# Patient Record
Sex: MALE | Race: WHITE
[De-identification: names, ages, dates, MRNs, and addresses within clinical notes are randomized per-mention and may not be internally consistent; named-entity substitution may affect disease eponyms.]

---

## 2017-09-08 ENCOUNTER — HOSPITAL ENCOUNTER (OUTPATIENT)
Dept: HOSPITAL 61 - MAMMO | Age: 62
Discharge: HOME | End: 2017-09-08
Attending: NURSE PRACTITIONER
Payer: COMMERCIAL

## 2017-09-08 DIAGNOSIS — N64.4: ICD-10-CM

## 2017-09-08 DIAGNOSIS — N62: ICD-10-CM

## 2017-09-08 DIAGNOSIS — R92.8: Primary | ICD-10-CM

## 2017-09-08 PROCEDURE — 76641 ULTRASOUND BREAST COMPLETE: CPT

## 2017-09-08 NOTE — RAD
DATE: 9/8/2017   



EXAM: DIGITAL DIAGNOSTIC BILATERAL, right breast ultrasound



HISTORY: Right breast pain   



COMPARISON: None available   



This study was interpreted with the benefit of Computerized Aided Detection

(CAD).





FINDINGS: There is streaky increased density in the right retroareolar region.

A much smaller, but otherwise similar density is present on the left. The

pattern is typical of unilateral gynecomastia. No other unusual breast

densities are seen. No suspicious microcalcifications are evident.





Right breast ultrasound, 9/8/2017:



A targeted ultrasound exam of the retroareolar region of the right breast was

performed. There is a mixed hypoechoic and hyperechoic process in the right

retroareolar region in a somewhat flame-shaped configuration.  No discrete

shadowing mass is seen. Correlation with the mammographic findings suggest

unilateral gynecomastia.





IMPRESSION: Right unilateral gynecomastia. Clinical surveillance is suggested.





BI-RADS CATEGORY: 2 BENIGN FINDING(S)



RECOMMENDED FOLLOW-UP: CLIN FOLLOW UP IMAGING AS CLINICALLY INDICATED





Mammography is a sensitive method for finding small breast cancers, but it

does not detect them all and is not a substitute for careful clinical

examination.  A negative mammogram does not negate a clinically suspicious

finding and should not result in delay in biopsying a clinically suspicious

abnormality.



"Our facility is accredited by the [employer] Mammography

Program."

## 2018-05-06 ENCOUNTER — HOSPITAL ENCOUNTER (EMERGENCY)
Dept: HOSPITAL 61 - ER | Age: 63
Discharge: HOME | End: 2018-05-06
Payer: COMMERCIAL

## 2018-05-06 DIAGNOSIS — F17.210: ICD-10-CM

## 2018-05-06 DIAGNOSIS — I10: ICD-10-CM

## 2018-05-06 DIAGNOSIS — J44.1: Primary | ICD-10-CM

## 2018-05-06 LAB
ADD MAN DIFF?: NO
ALBUMIN SERPL-MCNC: 3.5 G/DL (ref 3.4–5)
ALBUMIN/GLOB SERPL: 0.9 {RATIO} (ref 1–1.7)
ALP SERPL-CCNC: 101 U/L (ref 46–116)
ALT (SGPT): 50 U/L (ref 16–63)
ANION GAP SERPL CALC-SCNC: 9 MMOL/L (ref 6–14)
AST SERPL-CCNC: 32 U/L (ref 15–37)
BASE EXCESS ABG: 1 MMOL/L (ref -3–3)
BASO #: 0.1 X10^3/UL (ref 0–0.2)
BASO %: 1 % (ref 0–3)
BLOOD UREA NITROGEN: 16 MG/DL (ref 8–26)
BUN/CREAT SERPL: 16 (ref 6–20)
CALCIUM: 8.6 MG/DL (ref 8.5–10.1)
CHLORIDE: 109 MMOL/L (ref 98–107)
CO2 SERPL-SCNC: 28 MMOL/L (ref 21–32)
CREAT SERPL-MCNC: 1 MG/DL (ref 0.7–1.3)
EOS #: 0.1 X10^3/UL (ref 0–0.7)
EOS %: 1 % (ref 0–3)
FIO2 ABG: 28
GFR SERPLBLD BASED ON 1.73 SQ M-ARVRAT: 75.7 ML/MIN
GLOBULIN SER-MCNC: 3.7 G/DL (ref 2.2–3.8)
GLUCOSE SERPL-MCNC: 124 MG/DL (ref 70–99)
HCG SERPL-ACNC: 8.7 X10^3/UL (ref 4–11)
HCO3 ABG: 26 MMOL/L (ref 21–28)
HEMATOCRIT: 45.3 % (ref 39–53)
HEMOGLOBIN: 15.5 G/DL (ref 13–17.5)
LYMPH #: 2.1 X10^3/UL (ref 1–4.8)
LYMPH %: 24 % (ref 24–48)
MEAN CORPUSCULAR HEMOGLOBIN: 33 PG (ref 25–35)
MEAN CORPUSCULAR HGB CONC: 34 G/DL (ref 31–37)
MEAN CORPUSCULAR VOLUME: 96 FL (ref 79–100)
MONO #: 0.9 X10^3/UL (ref 0–1.1)
MONO %: 10 % (ref 0–9)
NEUT #: 5.6 X10^3UL (ref 1.8–7.7)
NEUT %: 64 % (ref 31–73)
NT-PRO BNP: 74 PG/ML (ref 0–124)
PCO2 ABG: 42 MMHG (ref 35–46)
PH ABG: 7.4 (ref 7.35–7.45)
PLATELET COUNT: 285 X10^3/UL (ref 140–400)
PO2 ABG: 77 MMHG (ref 65–108)
POTASSIUM SERPL-SCNC: 3.7 MMOL/L (ref 3.5–5.1)
RED BLOOD COUNT: 4.7 X10^6/UL (ref 4.3–5.7)
RED CELL DISTRIBUTION WIDTH: 14 % (ref 11.5–14.5)
SAT O2 ABG: 95 % (ref 92–99)
SODIUM: 146 MMOL/L (ref 136–145)
TOTAL BILIRUBIN: 0.2 MG/DL (ref 0.2–1)
TOTAL PROTEIN: 7.2 G/DL (ref 6.4–8.2)
TROPONINI: < 0.017 NG/ML (ref 0–0.06)

## 2018-05-06 PROCEDURE — 36415 COLL VENOUS BLD VENIPUNCTURE: CPT

## 2018-05-06 PROCEDURE — 80053 COMPREHEN METABOLIC PANEL: CPT

## 2018-05-06 PROCEDURE — 36600 WITHDRAWAL OF ARTERIAL BLOOD: CPT

## 2018-05-06 PROCEDURE — 96374 THER/PROPH/DIAG INJ IV PUSH: CPT

## 2018-05-06 PROCEDURE — 83880 ASSAY OF NATRIURETIC PEPTIDE: CPT

## 2018-05-06 PROCEDURE — 99285 EMERGENCY DEPT VISIT HI MDM: CPT

## 2018-05-06 PROCEDURE — 85025 COMPLETE CBC W/AUTO DIFF WBC: CPT

## 2018-05-06 PROCEDURE — 84484 ASSAY OF TROPONIN QUANT: CPT

## 2018-05-06 PROCEDURE — 93005 ELECTROCARDIOGRAM TRACING: CPT

## 2018-05-06 PROCEDURE — 82805 BLOOD GASES W/O2 SATURATION: CPT

## 2018-05-06 PROCEDURE — 71046 X-RAY EXAM CHEST 2 VIEWS: CPT

## 2018-05-06 PROCEDURE — 94640 AIRWAY INHALATION TREATMENT: CPT

## 2018-05-06 RX ADMIN — IPRATROPIUM BROMIDE AND ALBUTEROL SULFATE 1 ML: .5; 3 SOLUTION RESPIRATORY (INHALATION) at 17:49

## 2018-05-06 RX ADMIN — METHYLPREDNISOLONE SODIUM SUCCINATE 1 MG: 125 INJECTION, POWDER, FOR SOLUTION INTRAMUSCULAR; INTRAVENOUS at 16:40

## 2018-05-06 RX ADMIN — IPRATROPIUM BROMIDE AND ALBUTEROL SULFATE 1 ML: .5; 3 SOLUTION RESPIRATORY (INHALATION) at 16:07

## 2019-07-08 ENCOUNTER — HOSPITAL ENCOUNTER (INPATIENT)
Dept: HOSPITAL 61 - ER | Age: 64
LOS: 2 days | Discharge: HOME | DRG: 189 | End: 2019-07-10
Attending: INTERNAL MEDICINE | Admitting: INTERNAL MEDICINE
Payer: MEDICARE

## 2019-07-08 VITALS — DIASTOLIC BLOOD PRESSURE: 69 MMHG | SYSTOLIC BLOOD PRESSURE: 118 MMHG

## 2019-07-08 VITALS — SYSTOLIC BLOOD PRESSURE: 117 MMHG | DIASTOLIC BLOOD PRESSURE: 73 MMHG

## 2019-07-08 VITALS — DIASTOLIC BLOOD PRESSURE: 82 MMHG | SYSTOLIC BLOOD PRESSURE: 141 MMHG

## 2019-07-08 VITALS — WEIGHT: 190 LBS | BODY MASS INDEX: 28.79 KG/M2 | HEIGHT: 68 IN

## 2019-07-08 VITALS — SYSTOLIC BLOOD PRESSURE: 143 MMHG | DIASTOLIC BLOOD PRESSURE: 75 MMHG

## 2019-07-08 VITALS — SYSTOLIC BLOOD PRESSURE: 130 MMHG | DIASTOLIC BLOOD PRESSURE: 82 MMHG

## 2019-07-08 VITALS — DIASTOLIC BLOOD PRESSURE: 78 MMHG | SYSTOLIC BLOOD PRESSURE: 137 MMHG

## 2019-07-08 DIAGNOSIS — J96.01: Primary | ICD-10-CM

## 2019-07-08 DIAGNOSIS — J44.1: ICD-10-CM

## 2019-07-08 DIAGNOSIS — R65.10: ICD-10-CM

## 2019-07-08 DIAGNOSIS — Z71.6: ICD-10-CM

## 2019-07-08 DIAGNOSIS — F17.210: ICD-10-CM

## 2019-07-08 DIAGNOSIS — I10: ICD-10-CM

## 2019-07-08 DIAGNOSIS — J20.9: ICD-10-CM

## 2019-07-08 DIAGNOSIS — J44.0: ICD-10-CM

## 2019-07-08 LAB
ALBUMIN SERPL-MCNC: 3.4 G/DL (ref 3.4–5)
ALBUMIN/GLOB SERPL: 1 {RATIO} (ref 1–1.7)
ALP SERPL-CCNC: 91 U/L (ref 46–116)
ALT SERPL-CCNC: 59 U/L (ref 16–63)
ANION GAP SERPL CALC-SCNC: 10 MMOL/L (ref 6–14)
AST SERPL-CCNC: 35 U/L (ref 15–37)
BASOPHILS # BLD AUTO: 0.1 X10^3/UL (ref 0–0.2)
BASOPHILS NFR BLD: 1 % (ref 0–3)
BILIRUB SERPL-MCNC: 0.2 MG/DL (ref 0.2–1)
BUN SERPL-MCNC: 10 MG/DL (ref 8–26)
BUN/CREAT SERPL: 10 (ref 6–20)
CALCIUM SERPL-MCNC: 8.7 MG/DL (ref 8.5–10.1)
CHLORIDE SERPL-SCNC: 105 MMOL/L (ref 98–107)
CO2 SERPL-SCNC: 26 MMOL/L (ref 21–32)
CREAT SERPL-MCNC: 1 MG/DL (ref 0.7–1.3)
EOSINOPHIL NFR BLD: 0.3 X10^3/UL (ref 0–0.7)
EOSINOPHIL NFR BLD: 4 % (ref 0–3)
ERYTHROCYTE [DISTWIDTH] IN BLOOD BY AUTOMATED COUNT: 13.8 % (ref 11.5–14.5)
GFR SERPLBLD BASED ON 1.73 SQ M-ARVRAT: 75.2 ML/MIN
GLOBULIN SER-MCNC: 3.5 G/DL (ref 2.2–3.8)
GLUCOSE SERPL-MCNC: 98 MG/DL (ref 70–99)
HCT VFR BLD CALC: 44.5 % (ref 39–53)
HGB BLD-MCNC: 15.1 G/DL (ref 13–17.5)
LYMPHOCYTES # BLD: 1.3 X10^3/UL (ref 1–4.8)
LYMPHOCYTES NFR BLD AUTO: 19 % (ref 24–48)
MCH RBC QN AUTO: 34 PG (ref 25–35)
MCHC RBC AUTO-ENTMCNC: 34 G/DL (ref 31–37)
MCV RBC AUTO: 99 FL (ref 79–100)
MONO #: 0.8 X10^3/UL (ref 0–1.1)
MONOCYTES NFR BLD: 11 % (ref 0–9)
NEUT #: 4.7 X10^3UL (ref 1.8–7.7)
NEUTROPHILS NFR BLD AUTO: 66 % (ref 31–73)
PLATELET # BLD AUTO: 242 X10^3/UL (ref 140–400)
POTASSIUM SERPL-SCNC: 3.9 MMOL/L (ref 3.5–5.1)
PROT SERPL-MCNC: 6.9 G/DL (ref 6.4–8.2)
PROTHROMBIN TIME: 11.5 SEC (ref 11.7–14)
RBC # BLD AUTO: 4.51 X10^6/UL (ref 4.3–5.7)
SODIUM SERPL-SCNC: 141 MMOL/L (ref 136–145)
WBC # BLD AUTO: 7.2 X10^3/UL (ref 4–11)

## 2019-07-08 PROCEDURE — 96365 THER/PROPH/DIAG IV INF INIT: CPT

## 2019-07-08 PROCEDURE — 83880 ASSAY OF NATRIURETIC PEPTIDE: CPT

## 2019-07-08 PROCEDURE — 85025 COMPLETE CBC W/AUTO DIFF WBC: CPT

## 2019-07-08 PROCEDURE — 80053 COMPREHEN METABOLIC PANEL: CPT

## 2019-07-08 PROCEDURE — 84484 ASSAY OF TROPONIN QUANT: CPT

## 2019-07-08 PROCEDURE — 93005 ELECTROCARDIOGRAM TRACING: CPT

## 2019-07-08 PROCEDURE — 94644 CONT INHLJ TX 1ST HOUR: CPT

## 2019-07-08 PROCEDURE — 96375 TX/PRO/DX INJ NEW DRUG ADDON: CPT

## 2019-07-08 PROCEDURE — 36415 COLL VENOUS BLD VENIPUNCTURE: CPT

## 2019-07-08 PROCEDURE — 85610 PROTHROMBIN TIME: CPT

## 2019-07-08 PROCEDURE — 71045 X-RAY EXAM CHEST 1 VIEW: CPT

## 2019-07-08 PROCEDURE — 94760 N-INVAS EAR/PLS OXIMETRY 1: CPT

## 2019-07-08 PROCEDURE — 94618 PULMONARY STRESS TESTING: CPT

## 2019-07-08 PROCEDURE — 94640 AIRWAY INHALATION TREATMENT: CPT

## 2019-07-08 RX ADMIN — TIZANIDINE SCH MG: 4 TABLET ORAL at 21:02

## 2019-07-08 RX ADMIN — METHYLPREDNISOLONE SODIUM SUCCINATE SCH MG: 125 INJECTION, POWDER, FOR SOLUTION INTRAMUSCULAR; INTRAVENOUS at 18:06

## 2019-07-08 RX ADMIN — BUDESONIDE SCH MG: 0.5 INHALANT RESPIRATORY (INHALATION) at 19:19

## 2019-07-08 RX ADMIN — Medication SCH CAP: at 21:02

## 2019-07-08 RX ADMIN — IPRATROPIUM BROMIDE AND ALBUTEROL SULFATE SCH ML: .5; 3 SOLUTION RESPIRATORY (INHALATION) at 15:10

## 2019-07-08 RX ADMIN — NICOTINE PRN PATCH: 14 PATCH, EXTENDED RELEASE TOPICAL at 18:06

## 2019-07-08 RX ADMIN — IPRATROPIUM BROMIDE AND ALBUTEROL SULFATE SCH ML: .5; 3 SOLUTION RESPIRATORY (INHALATION) at 07:34

## 2019-07-08 RX ADMIN — IPRATROPIUM BROMIDE AND ALBUTEROL SULFATE SCH ML: .5; 3 SOLUTION RESPIRATORY (INHALATION) at 11:01

## 2019-07-08 RX ADMIN — IPRATROPIUM BROMIDE AND ALBUTEROL SULFATE SCH ML: .5; 3 SOLUTION RESPIRATORY (INHALATION) at 19:19

## 2019-07-08 RX ADMIN — IPRATROPIUM BROMIDE AND ALBUTEROL SULFATE SCH ML: .5; 3 SOLUTION RESPIRATORY (INHALATION) at 23:30

## 2019-07-08 RX ADMIN — DOXYCYCLINE HYCLATE SCH MG: 100 TABLET, COATED ORAL at 21:02

## 2019-07-08 NOTE — PHYS DOC
Past Medical History


Past Medical History:  COPD, Hypertension


Past Surgical History:  No Surgical History


Alcohol Use:  Occasionally


Drug Use:  None





Adult General


Chief Complaint


Chief Complaint:  SHORTNESS OF BREATH





HPI


HPI





Patient is a 64  year old male who presents with chief complaint of shortness of

breath. Slowly worsening throughout the day cough mostly dry no fever chest was 

tight with coughing.


History of COPD in the past oxygen saturation for the paramedics was 88% on room

air and was breathing pretty heavily he had some improvement with nebulizer. 

Patient's primary doctor is Dr. Phoenix





Review of Systems


Review of Systems





Constitutional: Denies fever or chills []


Eyes: Denies change in visual acuity, redness, or eye pain []


HENT: Denies nasal congestion or sore throat []


Respiratory: 


Cardiovascular: No additional information not addressed in HPI []





Musculoskeletal: Denies back pain or joint pain []


Integument: Denies rash or skin lesions []


Neurologic: Denies headache, focal weakness or sensory changes []


Endocrine: Denies polyuria or polydipsia []





All other systems were reviewed and found to be within normal limits, except as 

documented in this note.





Current Medications


Current Medications





Current Medications








 Medications


  (Trade)  Dose


 Ordered  Sig/Govind  Start Time


 Stop Time Status Last Admin


Dose Admin


 


 Albuterol Sulfate


  (Ventolin Neb


 Soln)  10 mg  1X  ONCE  7/8/19 01:00


 7/8/19 01:01 DC  





 


 Methylprednisolone


 Sodium Succinate


  (SOLU-Medrol


 125MG VIAL)  125 mg  1X  ONCE  7/8/19 01:00


 7/8/19 01:01 DC 7/8/19 00:52


125 MG











Allergies


Allergies





Allergies








Coded Allergies Type Severity Reaction Last Updated Verified


 


  No Known Drug Allergies    5/6/18 No











Physical Exam


Physical Exam





Constitutional: Well developed, well nourished, no acute distress, non-toxic 

appearance. []


HENT: Normocephalic, atraumatic, bilateral external ears normal, oropharynx m

oist, no oral exudates, nose normal. []


Eyes: PERRLA, EOMI, conjunctiva normal, no discharge. [] 


Neck: Normal range of motion, no tenderness, supple, no stridor. [] 


Cardiovascular:Heart rate regular rhythm, no murmur []


Lungs & Thorax:  Bilateral breath sounds clear to auscultation []


Abdomen: Bowel sounds normal, soft, no tenderness, no masses, no pulsatile 

masses. [] 


Skin: Warm, dry, no erythema, no rash. [] 


Back: No tenderness, no CVA tenderness. [] 


Extremities: No tenderness, no cyanosis, no clubbing, ROM intact, no edema. [] 


Neurologic: Alert and oriented X 3, normal motor function, normal sensory 

function, no focal deficits noted. []


Psychologic: Affect normal, judgement normal, mood normal. []





Current Patient Data


Vital Signs





                                   Vital Signs








  Date Time  Temp Pulse Resp B/P (MAP) Pulse Ox O2 Delivery O2 Flow Rate FiO2


 


7/8/19 00:25 98.6 107 26 145/71 (95) 98 Venturi Mask 6.0 





 98.6       








Lab Values





                                Laboratory Tests








Test


 7/8/19


00:25


 


White Blood Count


 7.2 x10^3/uL


(4.0-11.0)


 


Red Blood Count


 4.51 x10^6/uL


(4.30-5.70)


 


Hemoglobin


 15.1 g/dL


(13.0-17.5)


 


Hematocrit


 44.5 %


(39.0-53.0)


 


Mean Corpuscular Volume


 99 fL ()





 


Mean Corpuscular Hemoglobin 34 pg (25-35)  


 


Mean Corpuscular Hemoglobin


Concent 34 g/dL


(31-37)


 


Red Cell Distribution Width


 13.8 %


(11.5-14.5)


 


Platelet Count


 242 x10^3/uL


(140-400)


 


Neutrophils (%) (Auto) 66 % (31-73)  


 


Lymphocytes (%) (Auto) 19 % (24-48)  L


 


Monocytes (%) (Auto) 11 % (0-9)  H


 


Eosinophils (%) (Auto) 4 % (0-3)  H


 


Basophils (%) (Auto) 1 % (0-3)  


 


Neutrophils # (Auto)


 4.7 x10^3uL


(1.8-7.7)


 


Lymphocytes # (Auto)


 1.3 x10^3/uL


(1.0-4.8)


 


Monocytes # (Auto)


 0.8 x10^3/uL


(0.0-1.1)


 


Eosinophils # (Auto)


 0.3 x10^3/uL


(0.0-0.7)


 


Basophils # (Auto)


 0.1 x10^3/uL


(0.0-0.2)


 


Prothrombin Time


 11.5 SEC


(11.7-14.0)  L


 


Prothrombin Time INR 0.9 (0.8-1.1)  


 


Sodium Level


 141 mmol/L


(136-145)


 


Potassium Level


 3.9 mmol/L


(3.5-5.1)


 


Chloride Level


 105 mmol/L


()


 


Carbon Dioxide Level


 26 mmol/L


(21-32)


 


Anion Gap 10 (6-14)  


 


Blood Urea Nitrogen


 10 mg/dL


(8-26)


 


Creatinine


 1.0 mg/dL


(0.7-1.3)


 


Estimated GFR


(Cockcroft-Gault) 75.2  





 


BUN/Creatinine Ratio 10 (6-20)  


 


Glucose Level


 98 mg/dL


(70-99)


 


Calcium Level


 8.7 mg/dL


(8.5-10.1)


 


Total Bilirubin


 0.2 mg/dL


(0.2-1.0)


 


Aspartate Amino Transferase


(AST) 35 U/L (15-37)





 


Alanine Aminotransferase (ALT)


 59 U/L (16-63)





 


Alkaline Phosphatase


 91 U/L


()


 


Troponin I Quantitative


 < 0.017 ng/mL


(0.000-0.055)


 


NT-Pro-B-Type Natriuretic


Peptide 86 pg/mL


(0-124)


 


Total Protein


 6.9 g/dL


(6.4-8.2)


 


Albumin


 3.4 g/dL


(3.4-5.0)


 


Albumin/Globulin Ratio 1.0 (1.0-1.7)  





                                Laboratory Tests


7/8/19 00:25








                                Laboratory Tests


7/8/19 00:25














EKG


EKG


[]EKG shows sinus tach rate 114 definite acute ischemic changes noted there are 

some borderline changes in the inferior leads there is a poor baseline hard to 

tell for sure no definite ischemia





Radiology/Procedures


Radiology/Procedures


[]


Impressions:


Chest x-ray by my read I do not see a definite pneumonia. There is some blunting

 of the costophrenic angles bilaterally





Course & Med Decision Making


Course & Med Decision Making


Pertinent Labs and Imaging studies reviewed. (See chart for details)





[]E for omeprazole present with likely COPD exacerbation on initial evaluation 

he was quite short of breath had some tripoding a lot of wheezing tachypnea we 

gave an hour of continuous nebs and he actually improved significantly but given

 his hypoxia prior to arrival and significant symptoms we have opted to admit 

him overnight for further treatment and evaluation. Patient received albuterol 

Solu-Medrol and antibiotics doxycycline ordered in the emergency room. Admit to 

the hospitalist service





Dragon Disclaimer


Dragon Disclaimer


This electronic medical record was generated, in whole or in part, using a voice

 recognition dictation system.





Departure


Departure


Impression:  


   Primary Impression:  


   COPD exacerbation


Disposition:  09 ADMITTED AS INPATIENT


Admitting Physician:  HIMS


Condition:  STABLE


Referrals:  


ZOHRA PHOENIX (PCP)











MISTY LOPEZ MD             Jul 8, 2019 01:39

## 2019-07-08 NOTE — PDOC1
History and Physical


Date of Admission


Date of Admission


DATE: 7/8/19 


TIME: 08:55





Source


Source:  Chart review, Patient





History of Present Illness


History of Present Illness





 Mr. Balbuena,  is a 64  year old male admit overnight for dyspnea, and tachypnea 

and cough.


He has worsening dyspnea and distress yesterday.


 WIth a cough mostly dry no fever chest was tight with coughing.


Sa02 88% in field, RR  more than 30 per patient  





Patient's primary doctor is Dr. Phoenix


he had worked as a , is on disability for COPD, his wife has 

Parkinsons





Past Medical History


Cardiovascular:  Other


Pulmonary:  COPD


GI:  No pertinent hx


Heme/Onc:  No pertinent hx


Hepatobiliary:  No pertinent hx


Psych:  No pertinent hx


Rheumatologic:  No pertinent hx


ENT:  No pertinent hx


Renal/:  No pertinent hx





Past Surgical History


Past Surgical History:  No pertinent history





Family History


Family History:  No Significant





Social History


Smoke:  <1 pack per day


ALCOHOL:  rare


Drugs:  None





Current Medications


Current Medications





Current Medications


Albuterol Sulfate (Ventolin Neb Soln) 10 mg 1X  ONCE CONT NEB  Last administered

on 7/8/19at 03:14;  Start 7/8/19 at 01:00;  Stop 7/8/19 at 01:01;  Status DC


Methylprednisolone Sodium Succinate (SOLU-Medrol 125MG VIAL) 125 mg 1X  ONCE IV 

Last administered on 7/8/19at 00:52;  Start 7/8/19 at 01:00;  Stop 7/8/19 at 

01:01;  Status DC


Doxycycline Hyclate 100 mg/ Dextrose 100 ml @  50 mls/hr 1X  ONCE IV  Last 

administered on 7/8/19at 01:46;  Start 7/8/19 at 02:00;  Stop 7/8/19 at 03:59;  

Status DC


Albuterol/ Ipratropium (Duoneb) 3 ml RTQID NEB  Last administered on 7/8/19at 

07:34;  Start 7/8/19 at 08:00;  Stop 7/9/19 at 07:59





Active Scripts


Active


Medrol (Methylprednisolone) 4 Mg Tab.ds.pk 1 Pkg PO UD


Azithromycin Tablet (Azithromycin) 250 Mg Tablet 1 Pkg PO UD


Reported


Albuterol Sulfate Neb Soln (Albuterol Sulfate) 2.5 Mg/3 Ml Vial.neb 2.5 Mg NEB 

PRN PRN


Tizanidine Hcl 4 Mg Tablet 1 Tab PO QHS





Allergies


Allergies:  


Coded Allergies:  


     No Known Drug Allergies (Unverified , 5/6/18)





ROS


General:  No: Chills, Night Sweats, Fatigue, Malaise, Appetite, Other


PSYCHOLOGICAL ROS:  No: Anxiety, Behavioral Disorder, Concentration difficultie,

Decreased libido, Depression, Disorientation, Hallucinations, Hostility, 

Irritablity, Memory difficulties, Mood Swings, Obsessive thoughts, Physical 

abuse, Sexual abuse, Sleep disturbances, Suicidal ideation, Other


HEENT:  No: Heacaches, Visual Changes, Hearing change, Nasal congestion, Nasal 

discharge, Oral lesions, Sinus pain, Sore Throat, Epistaxis, Sneezing, Snoring, 

Tinnitus, Vertigo, Vocal changes, Other


Respiratory:  YES: Cough, Shortness of breath, SOB with excertion, Tachypnea; 


   No: Hemoptysis, Orthopnea, Pleuritic Pain, Sputum Changes, Stridor, Wheezing,

Other


Cardiovascular:  No Chest Pain, No Palpitations, No Orthopnea, No Paroxysmal 

Noc. Dyspnea, No Edema, No Lt Headedness, No Other


Gastrointestinal:  No Nausea, No Vomiting, No Abdominal Pain, No Diarrhea, No 

Constipation, No Melena, No Hematochezia, No Other


Genitourinary:  No Dysuria, No Frequency, No Incontinence, No Hematuria, No 

Retention, No Discharge, No Urgency, No Pain, No Flank Pain, No Other, No , No ,

No , No , No , No , No 


Musculoskeletal:  No Gait Disturbance, No Joint Pain, No Joint Stiffness, No 

Joint Swelling, No Muscle Pain, No Muscular Weakness, No Pain In:, No Swelling 

In:, No Other


Neurological:  No Behavorial Changes, No Bowel/Bladder ControlChng, No 

Confusion, No Dizziness, No Gait Disturbance, No Headaches, No Impaired 

Coord/balance, No Memory Loss, No Numbness/Tingling, No Seizures, No Speech 

Problems, No Tremors, No Visual Changes, No Weakness, No Other


Skin:  Yes Dry Skin; 


   No Eczema, No Hair Changes, No Lumps, No Mole Changes, No Mottling, No Nail 

Changes, No Pruritus, No Rash, No Skin Lesion Changes, No Other, No Acne





Physical Exam


General:  Alert, Oriented X3, Cooperative, No acute distress, mild distress


HEENT:  EOMI


Lungs:  Other (low relative volume,.   fine  rales, no rhonchi,   faint end 

wheeze,  left posterior, )


Heart:  S1S2, RRR, no gallops, no murmurs


Breasts:  Normal


Abdomen:  Normal bowel sounds, Soft


Rectal Exam:  not examined


Extremities:  No clubbing, No cyanosis, No edema, Normal pulses


Skin:  No rashes, No breakdown, No significant lesion


Neuro:  Normal tone, Cranial nerves 3-12 NL


Psych/Mental Status:  Mood NL





Vitals


Vitals





Vital Signs








  Date Time  Temp Pulse Resp B/P (MAP) Pulse Ox O2 Delivery O2 Flow Rate FiO2


 


7/8/19 07:37     97 Nasal Cannula 2.0 


 


7/8/19 07:00 98.3 100 17 141/82 (101)    





 98.3       











Labs


Labs





Laboratory Tests








Test


 7/8/19


00:25


 


White Blood Count


 7.2 x10^3/uL


(4.0-11.0)


 


Red Blood Count


 4.51 x10^6/uL


(4.30-5.70)


 


Hemoglobin


 15.1 g/dL


(13.0-17.5)


 


Hematocrit


 44.5 %


(39.0-53.0)


 


Mean Corpuscular Volume 99 fL () 


 


Mean Corpuscular Hemoglobin 34 pg (25-35) 


 


Mean Corpuscular Hemoglobin


Concent 34 g/dL


(31-37)


 


Red Cell Distribution Width


 13.8 %


(11.5-14.5)


 


Platelet Count


 242 x10^3/uL


(140-400)


 


Neutrophils (%) (Auto) 66 % (31-73) 


 


Lymphocytes (%) (Auto) 19 % (24-48) 


 


Monocytes (%) (Auto) 11 % (0-9) 


 


Eosinophils (%) (Auto) 4 % (0-3) 


 


Basophils (%) (Auto) 1 % (0-3) 


 


Neutrophils # (Auto)


 4.7 x10^3uL


(1.8-7.7)


 


Lymphocytes # (Auto)


 1.3 x10^3/uL


(1.0-4.8)


 


Monocytes # (Auto)


 0.8 x10^3/uL


(0.0-1.1)


 


Eosinophils # (Auto)


 0.3 x10^3/uL


(0.0-0.7)


 


Basophils # (Auto)


 0.1 x10^3/uL


(0.0-0.2)


 


Prothrombin Time


 11.5 SEC


(11.7-14.0)


 


Prothromb Time International


Ratio 0.9 (0.8-1.1) 





 


Sodium Level


 141 mmol/L


(136-145)


 


Potassium Level


 3.9 mmol/L


(3.5-5.1)


 


Chloride Level


 105 mmol/L


()


 


Carbon Dioxide Level


 26 mmol/L


(21-32)


 


Anion Gap 10 (6-14) 


 


Blood Urea Nitrogen


 10 mg/dL


(8-26)


 


Creatinine


 1.0 mg/dL


(0.7-1.3)


 


Estimated GFR


(Cockcroft-Gault) 75.2 





 


BUN/Creatinine Ratio 10 (6-20) 


 


Glucose Level


 98 mg/dL


(70-99)


 


Calcium Level


 8.7 mg/dL


(8.5-10.1)


 


Total Bilirubin


 0.2 mg/dL


(0.2-1.0)


 


Aspartate Amino Transf


(AST/SGOT) 35 U/L (15-37) 





 


Alanine Aminotransferase


(ALT/SGPT) 59 U/L (16-63) 





 


Alkaline Phosphatase


 91 U/L


()


 


Troponin I Quantitative


 < 0.017 ng/mL


(0.000-0.055)


 


NT-Pro-B-Type Natriuretic


Peptide 86 pg/mL


(0-124)


 


Total Protein


 6.9 g/dL


(6.4-8.2)


 


Albumin


 3.4 g/dL


(3.4-5.0)


 


Albumin/Globulin Ratio 1.0 (1.0-1.7) 








Laboratory Tests








Test


 7/8/19


00:25


 


White Blood Count


 7.2 x10^3/uL


(4.0-11.0)


 


Red Blood Count


 4.51 x10^6/uL


(4.30-5.70)


 


Hemoglobin


 15.1 g/dL


(13.0-17.5)


 


Hematocrit


 44.5 %


(39.0-53.0)


 


Mean Corpuscular Volume 99 fL () 


 


Mean Corpuscular Hemoglobin 34 pg (25-35) 


 


Mean Corpuscular Hemoglobin


Concent 34 g/dL


(31-37)


 


Red Cell Distribution Width


 13.8 %


(11.5-14.5)


 


Platelet Count


 242 x10^3/uL


(140-400)


 


Neutrophils (%) (Auto) 66 % (31-73) 


 


Lymphocytes (%) (Auto) 19 % (24-48) 


 


Monocytes (%) (Auto) 11 % (0-9) 


 


Eosinophils (%) (Auto) 4 % (0-3) 


 


Basophils (%) (Auto) 1 % (0-3) 


 


Neutrophils # (Auto)


 4.7 x10^3uL


(1.8-7.7)


 


Lymphocytes # (Auto)


 1.3 x10^3/uL


(1.0-4.8)


 


Monocytes # (Auto)


 0.8 x10^3/uL


(0.0-1.1)


 


Eosinophils # (Auto)


 0.3 x10^3/uL


(0.0-0.7)


 


Basophils # (Auto)


 0.1 x10^3/uL


(0.0-0.2)


 


Prothrombin Time


 11.5 SEC


(11.7-14.0)


 


Prothromb Time International


Ratio 0.9 (0.8-1.1) 





 


Sodium Level


 141 mmol/L


(136-145)


 


Potassium Level


 3.9 mmol/L


(3.5-5.1)


 


Chloride Level


 105 mmol/L


()


 


Carbon Dioxide Level


 26 mmol/L


(21-32)


 


Anion Gap 10 (6-14) 


 


Blood Urea Nitrogen


 10 mg/dL


(8-26)


 


Creatinine


 1.0 mg/dL


(0.7-1.3)


 


Estimated GFR


(Cockcroft-Gault) 75.2 





 


BUN/Creatinine Ratio 10 (6-20) 


 


Glucose Level


 98 mg/dL


(70-99)


 


Calcium Level


 8.7 mg/dL


(8.5-10.1)


 


Total Bilirubin


 0.2 mg/dL


(0.2-1.0)


 


Aspartate Amino Transf


(AST/SGOT) 35 U/L (15-37) 





 


Alanine Aminotransferase


(ALT/SGPT) 59 U/L (16-63) 





 


Alkaline Phosphatase


 91 U/L


()


 


Troponin I Quantitative


 < 0.017 ng/mL


(0.000-0.055)


 


NT-Pro-B-Type Natriuretic


Peptide 86 pg/mL


(0-124)


 


Total Protein


 6.9 g/dL


(6.4-8.2)


 


Albumin


 3.4 g/dL


(3.4-5.0)


 


Albumin/Globulin Ratio 1.0 (1.0-1.7) 











VTE Prophylaxis Ordered


VTE Prophylaxis Devices:  No


VTE Pharmacological Prophylaxi:  Yes





Assessment/Plan


Assessment/Plan


copd,  Acute bronchitis


SIRS


tobacco use disorder





admit











KAREN CURRIE MD                  Jul 8, 2019 08:56

## 2019-07-08 NOTE — CONS
DATE OF CONSULTATION:  



PULMONARY CONSULTATION



ATTENDING PHYSICIAN:  Dr. Hall.



REASON FOR CONSULTATION:  Dyspnea and respiratory failure.



HISTORY OF PRESENT ILLNESS:  The patient is a 64-year-old who has smoked for 40

years and still smokes cigarettes.  He came to the hospital with increasing

respiratory distress.  He has a cough, which is mostly productive of white

sputum on a daily basis.  No fever, no chills, no chest pain, no headache, and

no nausea, vomiting, or diarrhea.  His saturation was 88% in the field. 

Respirations were more than 30.  He was hospitalized.  His chest x-ray did not

reveal any acute infiltrates.  He still has wheezing.  I have been asked to see

him for further evaluation.



PAST MEDICAL HISTORY:  Significant for 40 years of tobacco use, suspect

underlying chronic obstructive pulmonary disease, could be severe.



PAST SURGICAL HISTORY:  No recent surgeries.



ALLERGIES:  None.



MEDICATIONS:  Reviewed as listed in the MRAD.



REVIEW OF SYSTEMS:  Twelve-point system obtained.  Pertinent positives are

discussed in my history of present illness, otherwise noncontributory.  All

systems that were negative were reviewed as well.



FAMILY HISTORY:  Noncontributory to lungs.



SOCIAL HISTORY:  Smoker for 40 years and still smokes less than 1 pack a day.



PHYSICAL EXAMINATION:

VITAL SIGNS:  Reviewed.  Pulse oximetry 97% on 2 liters.

NECK:  Supple.

LUNGS:  With bilateral expiratory wheezes.

CARDIOVASCULAR:  Regular rate and rhythm.

ABDOMEN:  Soft and nontender.

EXTREMITIES:  With no pitting edema.



LABORATORY DATA:  Reviewed.  White cell count 7.2, hemoglobin 15.1, and

platelets are 242.  BUN and creatinine 10 and 1.0.



IMPRESSION:

1.  Acute hypoxic respiratory failure secondary to acute exacerbation of chronic

obstructive pulmonary disease.

2.  Diffuse bronchospasm secondary to acute exacerbation of chronic obstructive

pulmonary disease.

3.  Chronic bronchitis with no acute pneumonia.



RECOMMENDATIONS:

1.  At this point, I have discussed with the patient extensively regarding

tobacco cessation and the impact of ongoing tobaccoism to his lungs.  He

understands that.

2.  Increase DuoNeb to every 4 hours.

3.  Add Pulmicort nebulizers.

4.  Continue oral prednisone.

5.  Oral doxycycline can be discontinued in the next 24 hours.

6.  Continue nicotine patch.

7.  We will follow along with you and discussed with RN.  We will wean oxygen

once saturation stays above 92% consistently.

 



______________________________

LIMA PAGE MD



DR:  AZAEL/chai  JOB#:  [id number] / [id number]

DD:  07/08/2019 13:00  DT:  07/08/2019 13:19

## 2019-07-08 NOTE — RAD
Examination: PORTABLE CHEST 1V

 

History: Shortness of breath

 

Comparison/Correlation: 5/6/2018 two-view chest x-ray exam

 

Findings: Portable upright frontal view chest was obtained. Heart size and

pulmonary vasculature are normal. No pneumothorax. Pulmonary 

hyperinflation is evident. Minimal blunting of the costophrenic angles 

greater on the left is unchanged and likely represents pleural thickening.

No acute bony process.

 

Impression:

No focal infiltrate.

 

COPD.

 

Electronically signed by: Tucker Street MD (7/8/2019 8:03 AM) [facility]

## 2019-07-08 NOTE — NUR
Patient has not exhibited any increased resp distress after rapid response.  Resp do not 
appear labored.  Patient affect sig more relaxed, able to converse in whole sentences.  Have 
encouraged patient to conserve energy, such as use urinal/bedside commode.  Have reinforced 
to patient to call for assist before getting up at all.

## 2019-07-08 NOTE — NUR
Patient complained of not being able to breathe. Pt was sitting up on side of bed, in 
apparent sig. distress, exaggerated open mouthed respirations, labored, patient appeared 
very anxious.  Rapid response was initiated.  O2 sat noted to be in 60's, with brief drop to 
56.  Oxygen changed to mask, RT paged stat for treatment.  O2 sat to 93 % , pt sl calmer, 
increased air movement noted after breathing treatment.  When treatment done, pt was placed 
on 35%/mask.  Occ sl moist cough, reported able to bring up a little, which was swallowed.  
Dr Leroy and Dr Boyd were notified, orders received.

## 2019-07-08 NOTE — EKG
[facility]

              [address] [place] [address]

Test Date:    2019               Test Time:    00:27:54

Pat Name:     GERTRUDE DINERO             Department:   

Patient ID:   [id number]           Room:          

Gender:       M                        Technician:   VAMSHI

:          1955               Requested By: MISTY LOPEZ

Order Number: [id number]           Reading MD:     

                                 Measurements

Intervals                              Axis          

Rate:         114                      P:            63

WV:           122                      QRS:          83

QRSD:         90                       T:            54

QT:           308                                    

QTc:          428                                    

                           Interpretive Statements

SINUS TACHYCARDIA

NO SPECIFIC ECG ABNORMALITIES

RI6.01          Unconfirmed report

No previous ECG available for comparison

## 2019-07-08 NOTE — NUR
SW following pt for anticipated dc planning. Chart reviewed and discussed with RN. Pt is 
from home with family. Pulmonary following pt. No SW needs noted at this time.

## 2019-07-09 VITALS — SYSTOLIC BLOOD PRESSURE: 121 MMHG | DIASTOLIC BLOOD PRESSURE: 70 MMHG

## 2019-07-09 VITALS — SYSTOLIC BLOOD PRESSURE: 112 MMHG | DIASTOLIC BLOOD PRESSURE: 74 MMHG

## 2019-07-09 VITALS — SYSTOLIC BLOOD PRESSURE: 127 MMHG | DIASTOLIC BLOOD PRESSURE: 74 MMHG

## 2019-07-09 VITALS — DIASTOLIC BLOOD PRESSURE: 64 MMHG | SYSTOLIC BLOOD PRESSURE: 113 MMHG

## 2019-07-09 VITALS — SYSTOLIC BLOOD PRESSURE: 122 MMHG | DIASTOLIC BLOOD PRESSURE: 74 MMHG

## 2019-07-09 VITALS — DIASTOLIC BLOOD PRESSURE: 67 MMHG | SYSTOLIC BLOOD PRESSURE: 108 MMHG

## 2019-07-09 RX ADMIN — NICOTINE PRN PATCH: 14 PATCH, EXTENDED RELEASE TOPICAL at 11:02

## 2019-07-09 RX ADMIN — METHYLPREDNISOLONE SODIUM SUCCINATE SCH MG: 125 INJECTION, POWDER, FOR SOLUTION INTRAMUSCULAR; INTRAVENOUS at 14:37

## 2019-07-09 RX ADMIN — IPRATROPIUM BROMIDE AND ALBUTEROL SULFATE SCH ML: .5; 3 SOLUTION RESPIRATORY (INHALATION) at 03:15

## 2019-07-09 RX ADMIN — IPRATROPIUM BROMIDE AND ALBUTEROL SULFATE SCH ML: .5; 3 SOLUTION RESPIRATORY (INHALATION) at 12:08

## 2019-07-09 RX ADMIN — IPRATROPIUM BROMIDE AND ALBUTEROL SULFATE SCH ML: .5; 3 SOLUTION RESPIRATORY (INHALATION) at 07:45

## 2019-07-09 RX ADMIN — Medication SCH CAP: at 08:34

## 2019-07-09 RX ADMIN — BUDESONIDE SCH MG: 0.5 INHALANT RESPIRATORY (INHALATION) at 07:45

## 2019-07-09 RX ADMIN — METHYLPREDNISOLONE SODIUM SUCCINATE SCH MG: 125 INJECTION, POWDER, FOR SOLUTION INTRAMUSCULAR; INTRAVENOUS at 06:18

## 2019-07-09 RX ADMIN — DOXYCYCLINE HYCLATE SCH MG: 100 TABLET, COATED ORAL at 08:34

## 2019-07-09 RX ADMIN — DOXYCYCLINE HYCLATE SCH MG: 100 TABLET, COATED ORAL at 20:33

## 2019-07-09 RX ADMIN — IPRATROPIUM BROMIDE AND ALBUTEROL SULFATE SCH ML: .5; 3 SOLUTION RESPIRATORY (INHALATION) at 16:18

## 2019-07-09 RX ADMIN — TIZANIDINE SCH MG: 4 TABLET ORAL at 20:32

## 2019-07-09 RX ADMIN — IPRATROPIUM BROMIDE AND ALBUTEROL SULFATE SCH ML: .5; 3 SOLUTION RESPIRATORY (INHALATION) at 20:35

## 2019-07-09 RX ADMIN — METHYLPREDNISOLONE SODIUM SUCCINATE SCH MG: 125 INJECTION, POWDER, FOR SOLUTION INTRAMUSCULAR; INTRAVENOUS at 20:33

## 2019-07-09 RX ADMIN — METHYLPREDNISOLONE SODIUM SUCCINATE SCH MG: 125 INJECTION, POWDER, FOR SOLUTION INTRAMUSCULAR; INTRAVENOUS at 00:22

## 2019-07-09 RX ADMIN — Medication SCH CAP: at 20:33

## 2019-07-09 RX ADMIN — BUDESONIDE SCH MG: 0.5 INHALANT RESPIRATORY (INHALATION) at 20:36

## 2019-07-09 NOTE — PDOC
PROGRESS NOTES


Chief Complaint


Chief Complaint


copd,  Acute bronchitis


acute hypoxia and hypercarbic respiratory failure, 


SIRS


tobacco use disorder





History of Present Illness


History of Present Illness


had difficulty last night,  IV steroids increased,    PO prednisone not given


cont the IV steroids, taper as able


Pulm following, 


more calm today


still w/ wheeze





Vitals


Vitals





Vital Signs








  Date Time  Temp Pulse Resp B/P (MAP) Pulse Ox O2 Delivery O2 Flow Rate FiO2


 


7/9/19 12:09     98 Nasal Cannula 2.0 


 


7/9/19 11:03 98.3 92 16 112/74 (87)    





 98.3       











Physical Exam


General:  Alert, Oriented X3, Cooperative, No acute distress, mild distress


Lungs:  Wheezing (faint)


Abdomen:  Normal bowel sounds, Soft


Extremities:  No clubbing, No cyanosis, No edema, Normal pulses


Skin:  No rashes, No breakdown, No significant lesion





Review of Systems


Review of Systems


cough, dyspnea, weakness





Assessment and Plan


Assessmemt and Plan


Problems


Medical Problems:


(1) Acute respiratory failure with hypoxia


Status: Acute  





(2) COPD exacerbation


Status: Acute  











Comment


Review of Relevant


I have reviewed the following items zeeshan (where applicable) has been applied.


Labs





Laboratory Tests








Test


 7/8/19


00:25


 


White Blood Count


 7.2 x10^3/uL


(4.0-11.0)


 


Red Blood Count


 4.51 x10^6/uL


(4.30-5.70)


 


Hemoglobin


 15.1 g/dL


(13.0-17.5)


 


Hematocrit


 44.5 %


(39.0-53.0)


 


Mean Corpuscular Volume 99 fL () 


 


Mean Corpuscular Hemoglobin 34 pg (25-35) 


 


Mean Corpuscular Hemoglobin


Concent 34 g/dL


(31-37)


 


Red Cell Distribution Width


 13.8 %


(11.5-14.5)


 


Platelet Count


 242 x10^3/uL


(140-400)


 


Neutrophils (%) (Auto) 66 % (31-73) 


 


Lymphocytes (%) (Auto) 19 % (24-48) 


 


Monocytes (%) (Auto) 11 % (0-9) 


 


Eosinophils (%) (Auto) 4 % (0-3) 


 


Basophils (%) (Auto) 1 % (0-3) 


 


Neutrophils # (Auto)


 4.7 x10^3uL


(1.8-7.7)


 


Lymphocytes # (Auto)


 1.3 x10^3/uL


(1.0-4.8)


 


Monocytes # (Auto)


 0.8 x10^3/uL


(0.0-1.1)


 


Eosinophils # (Auto)


 0.3 x10^3/uL


(0.0-0.7)


 


Basophils # (Auto)


 0.1 x10^3/uL


(0.0-0.2)


 


Prothrombin Time


 11.5 SEC


(11.7-14.0)


 


Prothromb Time International


Ratio 0.9 (0.8-1.1) 





 


Sodium Level


 141 mmol/L


(136-145)


 


Potassium Level


 3.9 mmol/L


(3.5-5.1)


 


Chloride Level


 105 mmol/L


()


 


Carbon Dioxide Level


 26 mmol/L


(21-32)


 


Anion Gap 10 (6-14) 


 


Blood Urea Nitrogen


 10 mg/dL


(8-26)


 


Creatinine


 1.0 mg/dL


(0.7-1.3)


 


Estimated GFR


(Cockcroft-Gault) 75.2 





 


BUN/Creatinine Ratio 10 (6-20) 


 


Glucose Level


 98 mg/dL


(70-99)


 


Calcium Level


 8.7 mg/dL


(8.5-10.1)


 


Total Bilirubin


 0.2 mg/dL


(0.2-1.0)


 


Aspartate Amino Transf


(AST/SGOT) 35 U/L (15-37) 





 


Alanine Aminotransferase


(ALT/SGPT) 59 U/L (16-63) 





 


Alkaline Phosphatase


 91 U/L


()


 


Troponin I Quantitative


 < 0.017 ng/mL


(0.000-0.055)


 


NT-Pro-B-Type Natriuretic


Peptide 86 pg/mL


(0-124)


 


Total Protein


 6.9 g/dL


(6.4-8.2)


 


Albumin


 3.4 g/dL


(3.4-5.0)


 


Albumin/Globulin Ratio 1.0 (1.0-1.7) 








Medications





Current Medications


Albuterol Sulfate (Ventolin Neb Soln) 10 mg 1X  ONCE CONT NEB  Last administered

on 7/8/19at 03:14;  Start 7/8/19 at 01:00;  Stop 7/8/19 at 01:01;  Status DC


Methylprednisolone Sodium Succinate (SOLU-Medrol 125MG VIAL) 125 mg 1X  ONCE IV 

Last administered on 7/8/19at 00:52;  Start 7/8/19 at 01:00;  Stop 7/8/19 at 

01:01;  Status DC


Doxycycline Hyclate 100 mg/ Dextrose 100 ml @  50 mls/hr 1X  ONCE IV  Last 

administered on 7/8/19at 01:46;  Start 7/8/19 at 02:00;  Stop 7/8/19 at 03:59;  

Status DC


Albuterol/ Ipratropium (Duoneb) 3 ml RTQID NEB  Last administered on 7/8/19at 

11:01;  Start 7/8/19 at 08:00;  Stop 7/8/19 at 13:02;  Status DC


Albuterol Sulfate (Ventolin Neb Soln) 2.5 mg PRN Q4HRS  PRN NEB WHEEZING Last 

administered on 7/8/19at 16:00;  Start 7/8/19 at 09:00


Tizanidine HCl (Zanaflex) 4 mg QHS PO  Last administered on 7/8/19at 21:02;  

Start 7/8/19 at 21:00


Doxycycline Hyclate (Vibra-Tab) 100 mg 1X  ONCE PO  Last administered on 

7/8/19at 09:56;  Start 7/8/19 at 09:15;  Stop 7/8/19 at 09:16;  Status DC


Doxycycline Hyclate (Vibra-Tab) 100 mg BID PO  Last administered on 7/9/19at 

08:34;  Start 7/8/19 at 21:00


Prednisone (Prednisone) 40 mg DAILY PO ;  Start 7/9/19 at 09:00;  Stop 7/9/19 at

11:22;  Status DC


Prednisone (Prednisone) 50 mg 1X  ONCE PO  Last administered on 7/8/19at 09:56; 

Start 7/8/19 at 09:15;  Stop 7/8/19 at 09:16;  Status DC


Nicotine (Nicoderm Cq 14mg) 1 patch PRN DAILY  PRN TD SMOKING CESSATION Last 

administered on 7/9/19at 11:02;  Start 7/8/19 at 09:15


Albuterol/ Ipratropium (Duoneb) 3 ml Q4HRS NEB  Last administered on 7/9/19at 

12:08;  Start 7/8/19 at 16:00


Budesonide (Pulmicort) 0.5 mg RTBID NEB  Last administered on 7/9/19at 07:45;  

Start 7/8/19 at 20:00


Lactobacillus Rhamnosus (Culturelle) 1 cap BID PO  Last administered on 7/9/19at

08:34;  Start 7/8/19 at 21:00


Alprazolam (Xanax) 0.25 mg PRN Q8HRS  PRN PO ANXIETY / AGITATION Last 

administered on 7/9/19at 04:07;  Start 7/8/19 at 16:45


Methylprednisolone Sodium Succinate (SOLU-Medrol 125MG VIAL) 60 mg Q6HRS IV  

Last administered on 7/9/19at 06:18;  Start 7/8/19 at 17:00;  Stop 7/9/19 at 

11:22;  Status DC


Alprazolam (Xanax) 0.25 mg PRN BID  PRN PO ANXIETY / AGITATION;  Start 7/8/19 at

16:30;  Stop 7/8/19 at 16:46;  Status DC


Methylprednisolone Sodium Succinate (SOLU-Medrol 125MG VIAL) 60 mg TID IV  Last 

administered on 7/9/19at 14:37;  Start 7/9/19 at 14:00





Active Scripts


Active


Medrol (Methylprednisolone) 4 Mg Tab.ds.pk 1 Pkg PO UD


Azithromycin Tablet (Azithromycin) 250 Mg Tablet 1 Pkg PO UD


Reported


Albuterol Sulfate Neb Soln (Albuterol Sulfate) 2.5 Mg/3 Ml Vial.neb 2.5 Mg NEB 

PRN PRN


Tizanidine Hcl 4 Mg Tablet 1 Tab PO QHS


Vitals/I & O





Vital Sign - Last 24 Hours








 7/8/19 7/8/19 7/8/19 7/8/19





 15:00 15:11 16:00 19:00


 


Temp 98.3   97.7





 98.3   97.7


 


Pulse 88   79


 


B/P (MAP) 130/82 (98)   117/73 (88)


 


Pulse Ox 95 97 97 97


 


O2 Delivery Room Air Nasal Cannula Nasal Cannula Room Air


 


O2 Flow Rate  2.0 2.0 


 


    





    





 7/8/19 7/8/19 7/8/19 7/8/19





 19:23 20:00 21:00 23:00


 


Temp   98.1 98.1





   98.1 98.1


 


Pulse   83 83


 


B/P (MAP)   118/69 (85) 118/69 (85)


 


Pulse Ox 98  97 97


 


O2 Delivery Nasal Cannula Nasal Cannula Room Air Room Air


 


O2 Flow Rate 5.0 3.0  


 


    





    





 7/8/19 7/9/19 7/9/19 7/9/19





 23:30 03:00 03:16 07:00


 


Temp  97.8  97.9





  97.8  97.9


 


Pulse  80  82


 


Resp    16


 


B/P (MAP)  121/70 (87)  113/64 (80)


 


Pulse Ox  95  96


 


O2 Delivery Nasal Cannula Room Air Nasal Cannula Nasal Cannula


 


O2 Flow Rate 3.0  3.0 3.0


 


    





    





 7/9/19 7/9/19 7/9/19 7/9/19





 07:47 07:49 08:20 11:03


 


Temp    98.3





    98.3


 


Pulse    92


 


Resp    16


 


B/P (MAP)    112/74 (87)


 


Pulse Ox 95 95  95


 


O2 Delivery Nasal Cannula Nasal Cannula Nasal Cannula Nasal Cannula


 


O2 Flow Rate 3.0 3.0 3.0 3.0


 


    





    





 7/9/19   





 12:09   


 


Pulse Ox 98   


 


O2 Delivery Nasal Cannula   


 


O2 Flow Rate 2.0   














Intake and Output   


 


 7/8/19 7/8/19 7/9/19





 15:00 23:00 07:00


 


Intake Total  50 ml 150 ml


 


Balance  50 ml 150 ml

















KAREN CURRIE MD                  Jul 9, 2019 14:50

## 2019-07-09 NOTE — PDOC
PULMONARY PROGRESS NOTES


Subjective


pt had worsening bronchospasm yesterday


changed to IV steroids


better today


Vitals





Vital Signs








  Date Time  Temp Pulse Resp B/P (MAP) Pulse Ox O2 Delivery O2 Flow Rate FiO2


 


7/9/19 11:03 98.3 92 16 112/74 (87) 95 Nasal Cannula 3.0 





 98.3       








ROS:  No Chest Pain, No Increase Cough


General:  Alert, No acute distress


Lungs:  Wheezing (faint)


Cardiovascular:  S1


Abdomen:  Soft


Neuro Exam:  Alert


Extremities:  Other (trace edema)


Labs





Laboratory Tests








Test


 7/8/19


00:25


 


White Blood Count


 7.2 x10^3/uL


(4.0-11.0)


 


Red Blood Count


 4.51 x10^6/uL


(4.30-5.70)


 


Hemoglobin


 15.1 g/dL


(13.0-17.5)


 


Hematocrit


 44.5 %


(39.0-53.0)


 


Mean Corpuscular Volume 99 fL () 


 


Mean Corpuscular Hemoglobin 34 pg (25-35) 


 


Mean Corpuscular Hemoglobin


Concent 34 g/dL


(31-37)


 


Red Cell Distribution Width


 13.8 %


(11.5-14.5)


 


Platelet Count


 242 x10^3/uL


(140-400)


 


Neutrophils (%) (Auto) 66 % (31-73) 


 


Lymphocytes (%) (Auto) 19 % (24-48) 


 


Monocytes (%) (Auto) 11 % (0-9) 


 


Eosinophils (%) (Auto) 4 % (0-3) 


 


Basophils (%) (Auto) 1 % (0-3) 


 


Neutrophils # (Auto)


 4.7 x10^3uL


(1.8-7.7)


 


Lymphocytes # (Auto)


 1.3 x10^3/uL


(1.0-4.8)


 


Monocytes # (Auto)


 0.8 x10^3/uL


(0.0-1.1)


 


Eosinophils # (Auto)


 0.3 x10^3/uL


(0.0-0.7)


 


Basophils # (Auto)


 0.1 x10^3/uL


(0.0-0.2)


 


Prothrombin Time


 11.5 SEC


(11.7-14.0)


 


Prothromb Time International


Ratio 0.9 (0.8-1.1) 





 


Sodium Level


 141 mmol/L


(136-145)


 


Potassium Level


 3.9 mmol/L


(3.5-5.1)


 


Chloride Level


 105 mmol/L


()


 


Carbon Dioxide Level


 26 mmol/L


(21-32)


 


Anion Gap 10 (6-14) 


 


Blood Urea Nitrogen


 10 mg/dL


(8-26)


 


Creatinine


 1.0 mg/dL


(0.7-1.3)


 


Estimated GFR


(Cockcroft-Gault) 75.2 





 


BUN/Creatinine Ratio 10 (6-20) 


 


Glucose Level


 98 mg/dL


(70-99)


 


Calcium Level


 8.7 mg/dL


(8.5-10.1)


 


Total Bilirubin


 0.2 mg/dL


(0.2-1.0)


 


Aspartate Amino Transf


(AST/SGOT) 35 U/L (15-37) 





 


Alanine Aminotransferase


(ALT/SGPT) 59 U/L (16-63) 





 


Alkaline Phosphatase


 91 U/L


()


 


Troponin I Quantitative


 < 0.017 ng/mL


(0.000-0.055)


 


NT-Pro-B-Type Natriuretic


Peptide 86 pg/mL


(0-124)


 


Total Protein


 6.9 g/dL


(6.4-8.2)


 


Albumin


 3.4 g/dL


(3.4-5.0)


 


Albumin/Globulin Ratio 1.0 (1.0-1.7) 








Medications





Active Scripts








 Medications  Dose


 Route/Sig


 Max Daily Dose Days Date Category


 


 Albuterol Sulfate


 Neb Soln


  (Albuterol


 Sulfate) 2.5 Mg/3


 Ml Vial.neb  2.5 Mg


 NEB PRN PRN


   7/8/19 Reported


 


 Tizanidine Hcl 4


 Mg Tablet  1 Tab


 PO QHS


   7/8/19 Reported


 


 Medrol


  (Methylprednisolone)


 4 Mg Tab.ds.pk  1 Pkg


 PO UD


   5/6/18 Rx


 


 Azithromycin


 Tablet


  (Azithromycin)


 250 Mg Tablet  1 Pkg


 PO UD


   5/6/18 Rx











Impression


.


1.  Acute hypoxic respiratory failure secondary to acute exacerbation of chronic


obstructive pulmonary disease.


2.  Diffuse bronchospasm secondary to acute exacerbation of chronic obstructive


pulmonary disease.


3.  Chronic bronchitis with no acute pneumonia.





Plan


.


RECOMMENDATIONS:


1.  At this point, I have discussed with the patient extensively regarding


tobacco cessation and the impact of ongoing tobaccoism to his lungs.  He


understands that.


2.   DuoNeb to every 4 hours.


3.   Pulmicort nebulizers.


4.  Continue IV steroids


5.  Oral doxycycline 


6.  Continue nicotine patch.


7.   improved. possible dc in 24 hrs











LIMA PAGE MD                  Jul 9, 2019 11:36

## 2019-07-10 VITALS
DIASTOLIC BLOOD PRESSURE: 75 MMHG | DIASTOLIC BLOOD PRESSURE: 75 MMHG | SYSTOLIC BLOOD PRESSURE: 134 MMHG | SYSTOLIC BLOOD PRESSURE: 134 MMHG

## 2019-07-10 VITALS — SYSTOLIC BLOOD PRESSURE: 112 MMHG | DIASTOLIC BLOOD PRESSURE: 76 MMHG

## 2019-07-10 RX ADMIN — IPRATROPIUM BROMIDE AND ALBUTEROL SULFATE SCH ML: .5; 3 SOLUTION RESPIRATORY (INHALATION) at 00:00

## 2019-07-10 RX ADMIN — IPRATROPIUM BROMIDE AND ALBUTEROL SULFATE SCH ML: .5; 3 SOLUTION RESPIRATORY (INHALATION) at 07:54

## 2019-07-10 RX ADMIN — METHYLPREDNISOLONE SODIUM SUCCINATE SCH MG: 125 INJECTION, POWDER, FOR SOLUTION INTRAMUSCULAR; INTRAVENOUS at 08:46

## 2019-07-10 RX ADMIN — IPRATROPIUM BROMIDE AND ALBUTEROL SULFATE SCH ML: .5; 3 SOLUTION RESPIRATORY (INHALATION) at 02:16

## 2019-07-10 RX ADMIN — BUDESONIDE SCH MG: 0.5 INHALANT RESPIRATORY (INHALATION) at 07:54

## 2019-07-10 RX ADMIN — DOXYCYCLINE HYCLATE SCH MG: 100 TABLET, COATED ORAL at 08:46

## 2019-07-10 RX ADMIN — NICOTINE PRN PATCH: 14 PATCH, EXTENDED RELEASE TOPICAL at 08:46

## 2019-07-10 RX ADMIN — IPRATROPIUM BROMIDE AND ALBUTEROL SULFATE SCH ML: .5; 3 SOLUTION RESPIRATORY (INHALATION) at 11:46

## 2019-07-10 RX ADMIN — Medication SCH CAP: at 08:46

## 2019-07-10 NOTE — DS
DATE OF DISCHARGE:  07/10/2019



ADMISSION DIAGNOSIS:  Chronic obstructive pulmonary disease.



DISCHARGE DIAGNOSIS:  Resolving chronic obstructive pulmonary disease.



HOSPITAL COURSE:  The patient is a pleasant 64-year-old male who presented with

a COPD exacerbation.  He was admitted.  We gave him IV steroids, breathing

treatments, oxygen, antibiotics and consulted Pulmonary.



PHYSICAL EXAMINATION:

GENERAL:  Today, when I saw him and examined him, he was doing better.

HEART:  His heart tones were normal.

Lungs:  Clear.

ABDOMEN:  Soft.

EXTREMITIES:  No edema.



Pulmonary has cleared him for discharge.  We plan to discharge.  I gave him

prescriptions for Medrol Dosepak and p.o. antibiotics.



DISPOSITION:  Home.



ACTIVITY:  As tolerated.



DIET:  Low sodium.



MEDICATIONS:  Please see the MRAD.



TOTAL TIME:  34 minutes.

 



______________________________

LARISA AGUILAR DO



DR:  ANGELICA/chai  JOB#:  [id number] / [id number]

DD:  07/10/2019 10:58  DT:  07/10/2019 11:29

## 2019-07-10 NOTE — PDOC
PULMONARY PROGRESS NOTES


Subjective





better today


Vitals





Vital Signs








  Date Time  Temp Pulse Resp B/P (MAP) Pulse Ox O2 Delivery O2 Flow Rate FiO2


 


7/10/19 08:00      Room Air  


 


7/10/19 07:56     94   


 


7/10/19 07:00 98.7 75 18 112/76 (88)    





 98.7       


 


7/9/19 23:00       2.0 








ROS:  No Chest Pain, No Increase Cough


General:  Alert, No acute distress


Lungs:  Wheezing (almost resolved)


Cardiovascular:  S1


Abdomen:  Soft


Neuro Exam:  Alert


Extremities:  Other (trace edema)


Medications





Active Scripts








 Medications  Dose


 Route/Sig


 Max Daily Dose Days Date Category


 


 Albuterol Sulfate


 Neb Soln


  (Albuterol


 Sulfate) 2.5 Mg/3


 Ml Vial.neb  2.5 Mg


 NEB PRN PRN


   7/8/19 Reported


 


 Tizanidine Hcl 4


 Mg Tablet  1 Tab


 PO QHS


   7/8/19 Reported


 


 Medrol


  (Methylprednisolone)


 4 Mg Tab.ds.pk  1 Pkg


 PO UD


   5/6/18 Rx


 


 Azithromycin


 Tablet


  (Azithromycin)


 250 Mg Tablet  1 Pkg


 PO UD


   5/6/18 Rx











Impression


.


1.  Acute hypoxic respiratory failure secondary to acute exacerbation of chronic


obstructive pulmonary disease.


2.  Diffuse bronchospasm secondary to acute exacerbation of chronic obstructive


pulmonary disease. resolved


3.  Chronic bronchitis with no acute pneumonia.





Plan


.


RECOMMENDATIONS:


1.  At this point, I have discussed with the patient extensively regarding


tobacco cessation and the impact of ongoing tobaccoism to his lungs.  He


understands that.


2.   DuoNeb 


3.   Pulmicort nebulizers.


4.   change to PO  steroids


5.  Oral doxycycline 


6.  Continue nicotine patch.


7.   improved.and better


      6 min walk today


      ok with dc home 


      d/w LIMA JOHNSON MD                 Jul 10, 2019 10:32

## 2019-07-10 NOTE — NUR
Discharge Note:



GERTRUDE DINERO



Discharge instructions and discharge home medications reviewed with Patient and a copy 
given. All questions have been answered and understanding verbalized. 



The following instructions and handouts were given: 



Discontinued lines and drain: Peripheral IV .



Patient discharged to Home or Self Care with Spouse via Amb[facility], walked off unit by RAN.

## 2020-06-18 ENCOUNTER — HOSPITAL ENCOUNTER (OUTPATIENT)
Dept: HOSPITAL 61 - NM | Age: 65
Discharge: HOME | End: 2020-06-18
Attending: INTERNAL MEDICINE
Payer: MEDICARE

## 2020-06-18 VITALS — HEIGHT: 69 IN | WEIGHT: 193 LBS | BODY MASS INDEX: 28.58 KG/M2

## 2020-06-18 DIAGNOSIS — R10.11: Primary | ICD-10-CM

## 2020-06-18 PROCEDURE — A9537 TC99M MEBROFENIN: HCPCS

## 2020-06-18 PROCEDURE — 78227 HEPATOBIL SYST IMAGE W/DRUG: CPT

## 2020-06-18 NOTE — RAD
HEPATOBILIARY SCAN WITH EJECTION FRACTION 

 

History: Reason: abdomen pain for 1 month 

 

Procedure: Serial static images are obtained of the liver and biliary 

system in the frontal projection following IV administration of 5.5 mCi of

Technetium 99m Choletec.  After filling of the gallbladder, 1.8 mcg of 

sincalide were infused over 30 minutes and dynamic imaging continued over 

this period. The gallbladder ejection fraction was calculated.

 

Findings: 

There is prompt hepatic clearance of tracer from the blood pool. There is 

homogeneous distribution throughout the liver. There is normal filling of 

the gallbladder and normal emptying into the biliary system and small 

bowel. The gallbladder ejection fraction measures 88% (normal gallbladder 

EF is 35% or greater).

 

IMPRESSION: 

1. The cystic duct and common bile duct are patent. Negative for acute 

cholecystitis.

2. The gallbladder ejection fraction is normal.

 

 

 

Electronically signed by: Cuba Fatima MD (6/18/2020 1:33 PM) [id number]